# Patient Record
Sex: MALE | Race: WHITE | URBAN - METROPOLITAN AREA
[De-identification: names, ages, dates, MRNs, and addresses within clinical notes are randomized per-mention and may not be internally consistent; named-entity substitution may affect disease eponyms.]

---

## 2021-03-25 ENCOUNTER — IMMUNIZATION (OUTPATIENT)
Dept: FAMILY MEDICINE CLINIC | Age: 56
End: 2021-03-25
Payer: COMMERCIAL

## 2021-03-25 PROCEDURE — 0001A COVID-19, PFIZER VACCINE 30MCG/0.3ML DOSE: CPT | Performed by: FAMILY MEDICINE

## 2021-03-25 PROCEDURE — 91300 COVID-19, PFIZER VACCINE 30MCG/0.3ML DOSE: CPT | Performed by: FAMILY MEDICINE

## 2021-04-15 ENCOUNTER — IMMUNIZATION (OUTPATIENT)
Dept: FAMILY MEDICINE CLINIC | Age: 56
End: 2021-04-15
Payer: COMMERCIAL

## 2021-04-15 PROCEDURE — 91300 COVID-19, PFIZER VACCINE 30MCG/0.3ML DOSE: CPT | Performed by: FAMILY MEDICINE

## 2021-04-15 PROCEDURE — 0002A COVID-19, PFIZER VACCINE 30MCG/0.3ML DOSE: CPT | Performed by: FAMILY MEDICINE

## 2024-01-01 ENCOUNTER — HOSPITAL ENCOUNTER (EMERGENCY)
Age: 59
End: 2024-01-16
Attending: EMERGENCY MEDICINE
Payer: COMMERCIAL

## 2024-01-01 DIAGNOSIS — I46.9 CARDIOPULMONARY ARREST (HCC): Primary | ICD-10-CM

## 2024-01-01 PROCEDURE — 6360000002 HC RX W HCPCS: Performed by: EMERGENCY MEDICINE

## 2024-01-01 PROCEDURE — 31500 INSERT EMERGENCY AIRWAY: CPT

## 2024-01-01 PROCEDURE — 99285 EMERGENCY DEPT VISIT HI MDM: CPT

## 2024-01-01 RX ORDER — LIDOCAINE HYDROCHLORIDE 20 MG/ML
INJECTION, SOLUTION INTRAVENOUS DAILY PRN
Status: COMPLETED | OUTPATIENT
Start: 2024-01-01 | End: 2024-01-01

## 2024-01-01 RX ORDER — EPINEPHRINE IN SOD CHLOR,ISO 1 MG/10 ML
SYRINGE (ML) INTRAVENOUS DAILY PRN
Status: COMPLETED | OUTPATIENT
Start: 2024-01-01 | End: 2024-01-01

## 2024-01-01 RX ADMIN — EPINEPHRINE 1 MG: 0.1 INJECTION INTRACARDIAC; INTRAVENOUS at 14:25

## 2024-01-01 RX ADMIN — EPINEPHRINE 1 MG: 0.1 INJECTION INTRACARDIAC; INTRAVENOUS at 14:38

## 2024-01-01 RX ADMIN — EPINEPHRINE 1 MG: 0.1 INJECTION INTRACARDIAC; INTRAVENOUS at 14:33

## 2024-01-01 RX ADMIN — LIDOCAINE HYDROCHLORIDE 150 MG: 20 INJECTION, SOLUTION INTRAVENOUS at 14:31

## 2024-01-16 NOTE — PROGRESS NOTES
01/16/24 1556   Encounter Summary   Encounter Overview/Reason  Grief, Loss, and Adjustments;Crisis   Service Provided For: Family   Referral/Consult From: Nurse   Support System Spouse;Children   Last Encounter  01/16/24  (Support to spouse and children, declined prayer )   Complexity of Encounter Moderate   Begin Time 1510   End Time  1545   Total Time Calculated 35 min   Grief, Loss, and Adjustments   Type Death   Assessment/Intervention/Outcome   Assessment Shock;Sad   Intervention Sustaining Presence/Ministry of presence   Outcome Grieving

## 2024-01-16 NOTE — ED NOTES
[facility] notified, spoke with Jen, will return call to this RN with decision      is with family in room 5 at this time,

## 2024-01-16 NOTE — ED PROVIDER NOTES
[facility] EMERGENCY DEPARTMENT    EMERGENCY DEPARTMENT ENCOUNTER        Patient Name: Alexander Sharma  MRN: [id number]  Birthdate 1965  Date of evaluation: 1/16/2024  PCP: No primary care provider on file.  Note Started: 3:37 PM EST 1/16/24    CHIEF COMPLAINT       Cardiac Arrest      HISTORY OF PRESENT ILLNESS: 1 or more Elements       Alexander Sharma is a 58 y.o. male into the emergency department as cardiac arrest.  Per EMS, patient had a witnessed arrest.  Family was doing CPR on scene.  He was cyanotic on arrival.  They tried giving Narcan.  They could not find a pulse.  When they put him on the monitor, he was in V-fib.  He was given 5 rounds of epinephrine, 6 shocks, 450 of amiodarone.  Reports giving total of 4 mg Narcan.  No change after Narcan.    Later, I spoke with the wife who provided further history.  Per wife, Myranda, she works overnight in ICU.  When she came down, she saw the patient sitting and did not look well.  He was complaining of chest pain.  She convinced him to go to the hospital.  He went upstairs to change and then had a witnessed arrest.  She started CPR and called 911. Patient has h/o HTN.     No other complaints, modifying factors or associated symptoms.     History obtained by the patient unless stated otherwise as above on HPI.   No limitations unless specified as above on HPI.     Past medical history: No past medical history on file.    Past surgical history: No past surgical history on file.    Home medications:   Prior to Admission medications    Not on File       Social history:      Family history:  No family history on file.    Allergies: Not on File    PMH, Surgical Hx, FH, Social Hx reviewed by myself.   Patient's care impacted by the above conditions.    REVIEW OF SYSTEMS :      Review of Systems  10 systems reviewed, pertinent positives per HPI otherwise noted to be negative.      SCREENINGS        Buster Coma Scale  OPO Notified: Yes  Date OPO

## 2024-01-16 NOTE — ED NOTES
Pt's wife Myranda in room 5 , updated on pt status per Dr fang and This RN , pt's son also to room 5, Woody and aware of pt status